# Patient Record
Sex: FEMALE | URBAN - METROPOLITAN AREA
[De-identification: names, ages, dates, MRNs, and addresses within clinical notes are randomized per-mention and may not be internally consistent; named-entity substitution may affect disease eponyms.]

---

## 2020-06-02 ENCOUNTER — WEB ENCOUNTER (OUTPATIENT)
Dept: URBAN - METROPOLITAN AREA CLINIC 23 | Facility: CLINIC | Age: 85
End: 2020-06-02

## 2020-06-05 ENCOUNTER — OFFICE VISIT (OUTPATIENT)
Dept: URBAN - METROPOLITAN AREA TELEHEALTH 2 | Facility: TELEHEALTH | Age: 85
End: 2020-06-05
Payer: MEDICARE

## 2020-06-05 ENCOUNTER — DASHBOARD ENCOUNTERS (OUTPATIENT)
Age: 85
End: 2020-06-05

## 2020-06-05 DIAGNOSIS — K59.09 CHRONIC CONSTIPATION: ICD-10-CM

## 2020-06-05 DIAGNOSIS — R14.0 BLOATING: ICD-10-CM

## 2020-06-05 PROCEDURE — 1036F TOBACCO NON-USER: CPT | Performed by: INTERNAL MEDICINE

## 2020-06-05 PROCEDURE — 99203 OFFICE O/P NEW LOW 30 MIN: CPT | Performed by: INTERNAL MEDICINE

## 2020-06-05 PROCEDURE — G8427 DOCREV CUR MEDS BY ELIG CLIN: HCPCS | Performed by: INTERNAL MEDICINE

## 2020-06-05 NOTE — HPI-TODAY'S VISIT:
This is an 89-year-old female who presents for evaluation of ongoing bowel issues.  She is accompanied by her daughter for history.  She has been having ongoing constipation and hard stool over the past few weeks to months.  The timeline is slightly indefinite.  She had a colonoscopy in  and was told she had a very tortuous colon and advised to stay on MiraLAX daily.  She has an not necessarily been doing this regularly over the past few months as best as I can tell.  She had been placed on iron in January for low iron levels found by her primary care practitioner.  Since starting the iron she has been noted to have darker stool.  She stopped the iron on May 27 and has noticed lightening of the stool since that time.  However she continues to have straining and abdominal discomfort with trying to have a bowel movement.  She also complains of bloating and abdominal distention.  She has not tried fiber supplements or increasing her fluid intake. She has no nausea vomiting.  Her appetite is generally low to start off with.  Her weight has been completely stable. She and her daughter are moving to [place] in 2 weeks.  They are also looking to establish care there.  However they had wanted to get "checked out" before they leave   Patient seen today via telehealth by agreement and consent of patient in light of current COVID-19 pandemic. I used video conferencing during the visit. The patient encounter is appropriate and reasonable under the circumstances given the patient's particular presentation at this time. The patient has been advised of the followin) the potential risks and limitations of this mode of treatment (including but not limited to the absence of in-person examination); 2) the right to refuse telehealth services at any point without affecting the right to future care; 3) the right to receive in-person services, included immediately after this consultation if an urgent need arises; 4) information, including identifiable images or information from this telehealth consult, will only be shared in accordance with HIPAA regulations. Any and all of the patient's and/or patient's family member's questions on this issue have been answered. The patient has verbally consented to be treated via telehealth services. The patient has also been advised to contact this office for worsening conditions or problems, and seek emergency medical treatment and/or call 911 if the patient deems either necessary.

## 2023-12-04 ENCOUNTER — OFFICE (OUTPATIENT)
Dept: URBAN - METROPOLITAN AREA CLINIC 18 | Facility: CLINIC | Age: 88
End: 2023-12-04

## 2023-12-04 VITALS
HEIGHT: 59 IN | HEART RATE: 69 BPM | SYSTOLIC BLOOD PRESSURE: 134 MMHG | OXYGEN SATURATION: 99 % | WEIGHT: 110 LBS | DIASTOLIC BLOOD PRESSURE: 80 MMHG

## 2023-12-04 DIAGNOSIS — R10.13 EPIGASTRIC PAIN: ICD-10-CM

## 2023-12-04 DIAGNOSIS — K21.9 GASTRO-ESOPHAGEAL REFLUX DISEASE WITHOUT ESOPHAGITIS: ICD-10-CM

## 2023-12-04 PROCEDURE — 99204 OFFICE O/P NEW MOD 45 MIN: CPT | Performed by: INTERNAL MEDICINE

## 2024-02-15 ENCOUNTER — AMBULATORY SURGICAL CENTER (OUTPATIENT)
Dept: URBAN - METROPOLITAN AREA SURGERY 7 | Facility: SURGERY | Age: 89
End: 2024-02-15

## 2024-02-15 ENCOUNTER — AMBULATORY SURGICAL CENTER (OUTPATIENT)
Dept: URBAN - METROPOLITAN AREA SURGERY 7 | Facility: SURGERY | Age: 89
End: 2024-02-15
Payer: MEDICARE

## 2024-02-15 VITALS
HEART RATE: 65 BPM | SYSTOLIC BLOOD PRESSURE: 132 MMHG | OXYGEN SATURATION: 96 % | HEART RATE: 64 BPM | OXYGEN SATURATION: 96 % | WEIGHT: 110 LBS | HEART RATE: 62 BPM | SYSTOLIC BLOOD PRESSURE: 131 MMHG | TEMPERATURE: 97.3 F | HEIGHT: 59 IN | OXYGEN SATURATION: 99 % | WEIGHT: 110 LBS | OXYGEN SATURATION: 98 % | SYSTOLIC BLOOD PRESSURE: 177 MMHG | DIASTOLIC BLOOD PRESSURE: 73 MMHG | HEART RATE: 64 BPM | OXYGEN SATURATION: 97 % | OXYGEN SATURATION: 99 % | HEART RATE: 62 BPM | HEART RATE: 60 BPM | SYSTOLIC BLOOD PRESSURE: 132 MMHG | DIASTOLIC BLOOD PRESSURE: 76 MMHG | SYSTOLIC BLOOD PRESSURE: 131 MMHG | OXYGEN SATURATION: 98 % | SYSTOLIC BLOOD PRESSURE: 142 MMHG | OXYGEN SATURATION: 97 % | DIASTOLIC BLOOD PRESSURE: 73 MMHG | HEART RATE: 67 BPM | DIASTOLIC BLOOD PRESSURE: 91 MMHG | DIASTOLIC BLOOD PRESSURE: 75 MMHG | SYSTOLIC BLOOD PRESSURE: 142 MMHG | DIASTOLIC BLOOD PRESSURE: 75 MMHG | HEART RATE: 67 BPM | HEART RATE: 65 BPM | RESPIRATION RATE: 16 BRPM | DIASTOLIC BLOOD PRESSURE: 91 MMHG | HEIGHT: 59 IN | RESPIRATION RATE: 16 BRPM | DIASTOLIC BLOOD PRESSURE: 76 MMHG | HEART RATE: 60 BPM | SYSTOLIC BLOOD PRESSURE: 177 MMHG | TEMPERATURE: 97.3 F

## 2024-02-15 DIAGNOSIS — R10.13 EPIGASTRIC PAIN: ICD-10-CM

## 2024-02-15 DIAGNOSIS — K44.9 DIAPHRAGMATIC HERNIA WITHOUT OBSTRUCTION OR GANGRENE: ICD-10-CM

## 2024-02-15 PROCEDURE — 43235 EGD DIAGNOSTIC BRUSH WASH: CPT | Performed by: INTERNAL MEDICINE

## 2024-02-15 RX ADMIN — SIMETHICONE 40 MG: 20 EMULSION ORAL at 10:42

## 2025-05-01 ENCOUNTER — HOSPITAL ENCOUNTER (OUTPATIENT)
Age: 89
Setting detail: SPECIMEN
Discharge: HOME | End: 2025-05-01